# Patient Record
Sex: MALE | Race: BLACK OR AFRICAN AMERICAN | Employment: UNEMPLOYED | URBAN - METROPOLITAN AREA
[De-identification: names, ages, dates, MRNs, and addresses within clinical notes are randomized per-mention and may not be internally consistent; named-entity substitution may affect disease eponyms.]

---

## 2017-01-04 ENCOUNTER — OFFICE VISIT - HEALTHEAST (OUTPATIENT)
Dept: BEHAVIORAL HEALTH | Facility: CLINIC | Age: 27
End: 2017-01-04

## 2017-01-04 DIAGNOSIS — F33.1 MAJOR DEPRESSIVE DISORDER, RECURRENT EPISODE, MODERATE (H): ICD-10-CM

## 2017-01-04 DIAGNOSIS — F41.1 GAD (GENERALIZED ANXIETY DISORDER): ICD-10-CM

## 2017-01-04 DIAGNOSIS — F43.10 PTSD (POST-TRAUMATIC STRESS DISORDER): ICD-10-CM

## 2017-02-07 ENCOUNTER — AMBULATORY - HEALTHEAST (OUTPATIENT)
Dept: BEHAVIORAL HEALTH | Facility: CLINIC | Age: 27
End: 2017-02-07

## 2017-02-22 ENCOUNTER — OFFICE VISIT - HEALTHEAST (OUTPATIENT)
Dept: BEHAVIORAL HEALTH | Facility: CLINIC | Age: 27
End: 2017-02-22

## 2017-02-22 DIAGNOSIS — F43.10 PTSD (POST-TRAUMATIC STRESS DISORDER): ICD-10-CM

## 2017-02-22 DIAGNOSIS — F41.1 GAD (GENERALIZED ANXIETY DISORDER): ICD-10-CM

## 2017-02-22 DIAGNOSIS — F33.1 MAJOR DEPRESSIVE DISORDER, RECURRENT EPISODE, MODERATE (H): ICD-10-CM

## 2017-03-15 ENCOUNTER — OFFICE VISIT - HEALTHEAST (OUTPATIENT)
Dept: BEHAVIORAL HEALTH | Facility: CLINIC | Age: 27
End: 2017-03-15

## 2017-03-15 DIAGNOSIS — F43.10 PTSD (POST-TRAUMATIC STRESS DISORDER): ICD-10-CM

## 2017-03-15 DIAGNOSIS — F41.1 GAD (GENERALIZED ANXIETY DISORDER): ICD-10-CM

## 2017-03-15 DIAGNOSIS — F33.1 MAJOR DEPRESSIVE DISORDER, RECURRENT EPISODE, MODERATE (H): ICD-10-CM

## 2017-03-22 ENCOUNTER — OFFICE VISIT - HEALTHEAST (OUTPATIENT)
Dept: BEHAVIORAL HEALTH | Facility: CLINIC | Age: 27
End: 2017-03-22

## 2017-03-22 DIAGNOSIS — F43.10 PTSD (POST-TRAUMATIC STRESS DISORDER): ICD-10-CM

## 2017-03-22 DIAGNOSIS — F33.1 MAJOR DEPRESSIVE DISORDER, RECURRENT EPISODE, MODERATE (H): ICD-10-CM

## 2017-03-22 DIAGNOSIS — F41.1 GAD (GENERALIZED ANXIETY DISORDER): ICD-10-CM

## 2021-06-08 NOTE — PROGRESS NOTES
This therapist attempted to call patient to inform him of the PTSD Skills Group starting on 2/15 @ 100.

## 2022-02-01 ENCOUNTER — HOSPITAL ENCOUNTER (EMERGENCY)
Facility: CLINIC | Age: 32
Discharge: HOME | End: 2022-02-02
Attending: EMERGENCY MEDICINE | Admitting: EMERGENCY MEDICINE
Payer: COMMERCIAL

## 2022-02-01 DIAGNOSIS — F19.10 POLYSUBSTANCE ABUSE (H): ICD-10-CM

## 2022-02-01 DIAGNOSIS — F22 PSYCHOSIS, PARANOID (H): ICD-10-CM

## 2022-02-01 DIAGNOSIS — F60.2 ANTISOCIAL PERSONALITY DISORDER (H): ICD-10-CM

## 2022-02-01 LAB
AMPHETAMINES UR QL SCN: ABNORMAL
BARBITURATES UR QL: ABNORMAL
BENZODIAZ UR QL: ABNORMAL
CANNABINOIDS UR QL SCN: ABNORMAL
COCAINE UR QL: ABNORMAL
OPIATES UR QL SCN: ABNORMAL

## 2022-02-01 PROCEDURE — 99285 EMERGENCY DEPT VISIT HI MDM: CPT | Mod: 25 | Performed by: EMERGENCY MEDICINE

## 2022-02-01 PROCEDURE — C9803 HOPD COVID-19 SPEC COLLECT: HCPCS | Performed by: EMERGENCY MEDICINE

## 2022-02-01 PROCEDURE — 90791 PSYCH DIAGNOSTIC EVALUATION: CPT

## 2022-02-01 PROCEDURE — 80307 DRUG TEST PRSMV CHEM ANLYZR: CPT | Performed by: EMERGENCY MEDICINE

## 2022-02-01 PROCEDURE — U0003 INFECTIOUS AGENT DETECTION BY NUCLEIC ACID (DNA OR RNA); SEVERE ACUTE RESPIRATORY SYNDROME CORONAVIRUS 2 (SARS-COV-2) (CORONAVIRUS DISEASE [COVID-19]), AMPLIFIED PROBE TECHNIQUE, MAKING USE OF HIGH THROUGHPUT TECHNOLOGIES AS DESCRIBED BY CMS-2020-01-R: HCPCS | Performed by: EMERGENCY MEDICINE

## 2022-02-01 PROCEDURE — 99285 EMERGENCY DEPT VISIT HI MDM: CPT | Performed by: EMERGENCY MEDICINE

## 2022-02-02 VITALS
OXYGEN SATURATION: 100 % | TEMPERATURE: 98 F | RESPIRATION RATE: 14 BRPM | DIASTOLIC BLOOD PRESSURE: 71 MMHG | HEART RATE: 85 BPM | SYSTOLIC BLOOD PRESSURE: 110 MMHG

## 2022-02-02 LAB — SARS-COV-2 RNA RESP QL NAA+PROBE: NEGATIVE

## 2022-02-02 RX ORDER — ALBUTEROL SULFATE 90 UG/1
1-2 AEROSOL, METERED RESPIRATORY (INHALATION) EVERY 4 HOURS PRN
COMMUNITY
Start: 2021-12-22

## 2022-02-02 RX ORDER — LORATADINE 10 MG/1
10 TABLET ORAL DAILY
COMMUNITY
Start: 2021-11-23

## 2022-02-02 RX ORDER — OLANZAPINE 5 MG/1
5 TABLET ORAL AT BEDTIME
COMMUNITY
Start: 2021-09-13

## 2022-02-02 RX ORDER — BUPROPION HYDROCHLORIDE 300 MG/1
300 TABLET ORAL EVERY MORNING
COMMUNITY
Start: 2021-08-23

## 2022-02-02 RX ORDER — HYDROXYZINE PAMOATE 25 MG/1
CAPSULE ORAL
COMMUNITY
Start: 2021-09-01 | End: 2022-02-02

## 2022-02-02 RX ORDER — OLANZAPINE 10 MG/2ML
10 INJECTION, POWDER, FOR SOLUTION INTRAMUSCULAR
Status: DISCONTINUED | OUTPATIENT
Start: 2022-02-02 | End: 2022-02-02 | Stop reason: HOSPADM

## 2022-02-02 RX ORDER — ACETAMINOPHEN 160 MG
1 TABLET,DISINTEGRATING ORAL DAILY
COMMUNITY
Start: 2021-11-23

## 2022-02-02 RX ORDER — BUPROPION HYDROCHLORIDE 150 MG/1
150 TABLET ORAL EVERY MORNING
COMMUNITY
Start: 2021-08-11

## 2022-02-02 ASSESSMENT — ENCOUNTER SYMPTOMS
EYE REDNESS: 0
SORE THROAT: 0
ARTHRALGIAS: 0
FEVER: 0
CONFUSION: 0
DIFFICULTY URINATING: 0
HEADACHES: 0
SHORTNESS OF BREATH: 0
COLOR CHANGE: 0
ABDOMINAL PAIN: 0
NECK STIFFNESS: 0

## 2022-02-02 NOTE — ED PROVIDER NOTES
Patient stable here in the ER. Current plan at this point to admitted to [facility] for ongoing mental health evaluation by Dr. Zuniga. Forms filled out etc. patient be transferred once ride is here and reports with call.    /67   Pulse 91   Temp 98.2  F (36.8  C) (Oral)   Resp 16   SpO2 96%   This note was created at least in part by the use of dragon voice dictation system. Inadvertent typographical errors may still exist.  Remi Marie MD.    Patient evaluated in the emergency department during the COVID-19 pandemic period. Careful attention to patients safety was addressed throughout the evaluation. Evaluation and treatment management was initiated with disposition made efficiently and appropriate as possible to minimize any risk of potential exposure to patient during this evaluation.       Remi Marie MD  02/02/22 1224      Patient plan to be transferred to [facility] for ongoing management patient then when paramedics were here to transfer was somewhat resistant to going initially.      I talked to patient at length his is concerned that he needs a rule 25 initiated so he can help with his chemical dependency which he feels is the underlying cause of a lot of his issues.    I had contacted our intake people who contacted the unit who stated social workers can initiate the rule 25 of people around the unit.  Patient informed of this and was agreeable to going.      Patient then did not want to go via stretcher felt that the female paramedic was too masculine for him and he had other concerns he wanted to leave I instructed him that he needs to be reassessed again by her mental health .    Patient then seen by mental health  who had seen him earlier this morning also.  Still ongoing concerns with patient of some underlying psychosis with paranoia with some suicidal ideations that have been stated still somewhat heightened concern of safety issues without  clarification feel patient still poses a risk and a danger to himself and others potentially.      At this point we have no choice but to placement a hold insist that he come in for mental health stabilization and can also get rule 25 initiation also.      Planning to still  admit the patient to [facility] where he does have a bed is currently on a 72-hour hold for still continued safety concerns.               Remi Marie MD  02/02/22 1534

## 2022-02-02 NOTE — ED NOTES
Pt. has slept well throughout the night.  No behavioral issues noted this shift. Will remain in ED until appropriate bed available.  Continue present plan of care.

## 2022-02-02 NOTE — PHARMACY-ADMISSION MEDICATION HISTORY
Admission Medication History Completed by Pharmacy    See [facility] Admission Navigator for allergy information, preferred outpatient pharmacy, prior to admission medications and immunization status.     Medication history sources:  patient interview, SureScripts    Pertinent changes made to PTA medication list:  Added: N/A  Deleted: N/A  Changed: N/A    Additional medication history information:   - Patient states he hasn't had any medications in ~2 months, consistent with last dispense history.  - Patient denies taking any additional Rx/OTC medications other than the ones listed below.    Prior to Admission medications    Medication Sig Last Dose Taking? Auth Provider   albuterol (PROAIR HFA/PROVENTIL HFA/VENTOLIN HFA) 108 (90 Base) MCG/ACT inhaler Inhale 1-2 puffs into the lungs every 4 hours as needed for shortness of breath / dyspnea or wheezing  PRN Yes Reported, Patient   buPROPion (WELLBUTRIN XL) 150 MG 24 hr tablet Take 150 mg by mouth every morning Along with 300 mg tablet for total dose of 450 mg daily More than a month Yes Reported, Patient   buPROPion (WELLBUTRIN XL) 300 MG 24 hr tablet Take 300 mg by mouth every morning Along with 150 mg tablet for total dose of 450 mg daily More than a month Yes Reported, Patient   Cholecalciferol (VITAMIN D3) 50 MCG (2000 UT) CAPS Take 1 capsule by mouth daily More than a month Yes Reported, Patient   loratadine (CLARITIN) 10 MG tablet Take 10 mg by mouth daily  More than a month Yes Reported, Patient   OLANZapine (ZYPREXA) 5 MG tablet Take 5 mg by mouth At Bedtime  More than a month Yes Reported, Patient   omeprazole (PRILOSEC) 20 MG DR capsule Take 20 mg by mouth daily  More than a month Yes Reported, Patient     Date completed: 02/02/22    Medication history completed by:   Chong South, PharmD, BCPS  [facility]  Emergency Department: Ascom *[id number]
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
## 2022-02-02 NOTE — DISCHARGE INSTRUCTIONS
Discharge from the emergency room with plans to seek rule 25 and follow-up with [facility] for outpatient treatment as discussed.  Return to the emergency room if any thoughts of harming self or others.    If I am feeling unsafe or I am in a crisis, I will:  Contact my established care providers   Call the National Suicide Prevention Lifeline: [phone]   Go to the nearest emergency room   Call [phone]     Warning signs that I or other people might notice when a crisis is developing for me: I can susan easily frustrated, and avoid people    Things I am able to do on my own to cope or help me feel better: rest, take breaks    Things that I am able to do with others to cope or help me better: spend time with my kids and my family    Things I can use or do for distraction: TV, Movies    Changes I can make to support my mental health and wellness: Susan Rule 25, follow recommendations    People in my life that I can ask for help: [employer] Staff    Your [facility] has a mental health crisis team you can call 24/7: Carlos A SCOTT [phone]    Other things that are important when I m in crisis: Raising my kids     Additional resources and information:     Go to [facility] 2/3/2022 for an intake meeting with Devonte

## 2022-02-02 NOTE — ED NOTES
Patient given 72 hour hold paperwork. Patient became agitated and started threatening to have his people and  come down here and cause problems. Patient is confidential in the system and should not have visitors at this time. Patient continues to remain in room on cell phone.
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
## 2022-02-02 NOTE — ED NOTES
[facility] ED Mental Health Handoff Note:       Brief HPI:  This is a 32 year old male signed out to me by Dr. Balbuena.  See initial ED Provider note for full details of the presentation.   Interval history is pertinent for no acute issues  .    Home meds reviewed and ordered/administered: Yes    Medically stable for inpatient mental health admission: Yes.    Evaluated by mental health: Yes. The recommendation is for inpatient mental health treatment. Bed search in process    Safety concerns: At the time I received sign out, there were no safety concerns.    Hold Status:  Active Orders   Legal    Health Officer Authority to Detain (MARISELA)     Frequency: Effective Now     Start Date/Time: 02/01/22 1901      Number of Occurrences: Until Specified     Order Comments: This patient presented with circumstances that have led me to be reasonably suspicious that the patient is at significant risk of self-harm. The patient's judgment to this situation appears to be impaired. Given the circumstances in which the patient presented, it is likely that the patient is at significant risk of attempting self harm if this situation is not investigated further. I am highly concerned that the patient is mentally ill and currently cannot safely care for oneself. This represents endangerment to the patient's well-being and safety, and I am placing a Health Officer Authority hold upon the patient at this time.              Exam:   Patient Vitals for the past 24 hrs:   BP Temp Temp src Pulse Resp SpO2   02/02/22 0829 101/67 -- -- 91 -- 96 %   02/01/22 2344 120/76 98.2  F (36.8  C) Oral 76 16 100 %   02/01/22 1858 113/72 97.6  F (36.4  C) Oral 84 18 100 %             ED Course:    Medications - No data to display         There were significant events during my shift. See other notes.    Patient was signed out to the oncoming provider, Dr. Pelaez      Impression:    ICD-10-CM    1. Polysubstance abuse (H)  F19.10    2. Psychosis,  paranoid (H)  F22     suspect 2/2 methamphetatmines       Plan:    1. Awaiting inpatient mental health admission/transfer.      RESULTS:   Results for orders placed or performed during the hospital encounter of 02/01/22 (from the past 24 hour(s))   Asymptomatic COVID-19 Virus (Coronavirus) by PCR Nasopharyngeal     Status: Normal    Collection Time: 02/01/22 10:28 PM    Specimen: Nasopharyngeal; Swab   Result Value Ref Range    SARS CoV2 PCR Negative Negative    Narrative    Testing was performed using the franck  SARS-CoV-2 & Influenza A/B Assay on the franck  Licha  System.  This test should be ordered for the detection of SARS-COV-2 in individuals who meet SARS-CoV-2 clinical and/or epidemiological criteria. Test performance is unknown in asymptomatic patients.  This test is for in vitro diagnostic use under the FDA EUA for laboratories certified under CLIA to perform moderate and/or high complexity testing. This test has not been FDA cleared or approved.  A negative test does not rule out the presence of PCR inhibitors in the specimen or target RNA in concentration below the limit of detection for the assay. The possibility of a false negative should be considered if the patient's recent exposure or clinical presentation suggests COVID-19.  [facility] are certified under the Clinical Laboratory Improvement Amendments of 1988 (CLIA-88) as qualified to perform moderate and/or high complexity laboratory testing.   Urine Drugs of Abuse Screen     Status: Abnormal    Collection Time: 02/01/22 10:30 PM    Narrative    The following orders were created for panel order Urine Drugs of Abuse Screen.  Procedure                               Abnormality         Status                     ---------                               -----------         ------                     Drug abuse screen 1 urin...[[id number]]  Abnormal            Final result                 Please view results for these tests on the  individual orders.   Drug abuse screen 1 urine (ED)     Status: Abnormal    Collection Time: 02/01/22 10:30 PM   Result Value Ref Range    Amphetamines Urine Screen Positive (A) Screen Negative    Barbiturates Urine Screen Negative Screen Negative    Benzodiazepines Urine Screen Negative Screen Negative    Cannabinoids Urine Screen Positive (A) Screen Negative    Cocaine Urine Screen Negative Screen Negative    Opiates Urine Screen Negative Screen Negative             MD Cynthia Leonardo, Remi Morgan MD  02/02/22 1954

## 2022-02-02 NOTE — PROGRESS NOTES
Sanaz Parson is reviewed for [facility] service. Will follow and meet with patient/family/care team as able or requested.     Ricky Etienne  [facility], [facility]   [phone]

## 2022-02-02 NOTE — ED NOTES
[facility] ED Mental Health Handoff Note:       Brief HPI:  This is a 32 year old male signed out to me by Dr. Balbuena.  See initial ED Provider note for full details of the presentation. Interval history is pertinent for no acute issues.    Home meds reviewed and ordered/administered: Yes    Medically stable for inpatient mental health admission: Yes.    Evaluated by mental health: Yes. The recommendation is for inpatient mental health treatment. Bed search in process    Safety concerns: At the time I received sign out, there were no safety concerns.    Hold Status:  Active Orders   Legal    Health Officer Authority to Detain (MARISELA)     Frequency: Effective Now     Start Date/Time: 02/01/22 1901      Number of Occurrences: Until Specified     Order Comments: This patient presented with circumstances that have led me to be reasonably suspicious that the patient is at significant risk of self-harm. The patient's judgment to this situation appears to be impaired. Given the circumstances in which the patient presented, it is likely that the patient is at significant risk of attempting self harm if this situation is not investigated further. I am highly concerned that the patient is mentally ill and currently cannot safely care for oneself. This represents endangerment to the patient's well-being and safety, and I am placing a Health Officer Authority hold upon the patient at this time.              Exam:   Patient Vitals for the past 24 hrs:   BP Temp Temp src Pulse Resp SpO2   02/02/22 0829 101/67 -- -- 91 -- 96 %   02/01/22 2344 120/76 98.2  F (36.8  C) Oral 76 16 100 %   02/01/22 1858 113/72 97.6  F (36.4  C) Oral 84 18 100 %             ED Course:    Medications - No data to display         There were significant events during my shift. See other notes for documentation.    Patient was signed out to the oncoming provider, Dr. Pelaez      Impression:    ICD-10-CM    1. Polysubstance abuse (H)  F19.10     2. Psychosis, paranoid (H)  F22     suspect 2/2 methamphetatmines       Plan:    1. Awaiting inpatient mental health admission/transfer.      RESULTS:   Results for orders placed or performed during the hospital encounter of 02/01/22 (from the past 24 hour(s))   Asymptomatic COVID-19 Virus (Coronavirus) by PCR Nasopharyngeal     Status: Normal    Collection Time: 02/01/22 10:28 PM    Specimen: Nasopharyngeal; Swab   Result Value Ref Range    SARS CoV2 PCR Negative Negative    Narrative    Testing was performed using the franck  SARS-CoV-2 & Influenza A/B Assay on the franck  Licha  System.  This test should be ordered for the detection of SARS-COV-2 in individuals who meet SARS-CoV-2 clinical and/or epidemiological criteria. Test performance is unknown in asymptomatic patients.  This test is for in vitro diagnostic use under the FDA EUA for laboratories certified under CLIA to perform moderate and/or high complexity testing. This test has not been FDA cleared or approved.  A negative test does not rule out the presence of PCR inhibitors in the specimen or target RNA in concentration below the limit of detection for the assay. The possibility of a false negative should be considered if the patient's recent exposure or clinical presentation suggests COVID-19.  [facility] are certified under the Clinical Laboratory Improvement Amendments of 1988 (CLIA-88) as qualified to perform moderate and/or high complexity laboratory testing.   Urine Drugs of Abuse Screen     Status: Abnormal    Collection Time: 02/01/22 10:30 PM    Narrative    The following orders were created for panel order Urine Drugs of Abuse Screen.  Procedure                               Abnormality         Status                     ---------                               -----------         ------                     Drug abuse screen 1 urin...[[id number]]  Abnormal            Final result                 Please view results for these  tests on the individual orders.   Drug abuse screen 1 urine (ED)     Status: Abnormal    Collection Time: 02/01/22 10:30 PM   Result Value Ref Range    Amphetamines Urine Screen Positive (A) Screen Negative    Barbiturates Urine Screen Negative Screen Negative    Benzodiazepines Urine Screen Negative Screen Negative    Cannabinoids Urine Screen Positive (A) Screen Negative    Cocaine Urine Screen Negative Screen Negative    Opiates Urine Screen Negative Screen Negative             MD Cynthia Leonardo, Remi Morgan MD  02/02/22 1950
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
## 2022-02-02 NOTE — PROGRESS NOTES
"Extended Care    Sanaz Parson  February 2, 2022    Sanaz is followed related to Other: reassessment. Please see initial DEC Crisis Assessment completed by Natalie Fritsch on 2/1/2022 for complete assessment information. Notable concerns include suicidal ideation, substance abuse, auditory, visual and tactile hallucinations.     Patient is interviewed for a 2nd time today via [employer]. Pt is alert and listless; affect is hostile; mood is frustrated and anxious. Patient appears much more paranoid as writer met with him a second time. Patient reports that \"funny stuff is happening here they are trying to mess with me now man\". Patient does not believe staff are being honest with him, and makes loose associations stating, \"That woman is to masculine, but she pees sitting down\" and and \"they cant  me because they sleep in queen mattresses and I have a hospital bed\".. Patient then states, \"I need a rule 25, and inpatient, and apparently they cannot give me that here\" although patient had refused a transport for an inpatient  bed. When writer spoke with pt earlier pt endorsed suicidal thoughts, but does not disclose what they are, or if he has a plan, but does reference using drugs to kill himself. However when asked about suicidal thoughts again patient reports, \"Whatever happens happens, I just need to get out of here\". There are concerns for Physical aggression, as yesterday patient made statements about attacking others so they would attack and kill him. Over the course of contact, provided reassurance, offered validation, engaged patient in problem solving and disposition planning and provided psychoeducation.     Writer feels that due to patients current paranoia, as well as thoughts of using drugs to overdose that patient is a risk to himself and making statements about attacking others so they might attack and kill him,      ED care team is updated, including Dr. Marie. Discussed patient is likely " "unsafe to be discharged.     Writer received a call from Dr. Pelaez requesting an additional reassessment be completed.    Writer met with patient again who immediately apologized to writer for her previous behavior and states, \"I should have taken a breathe and explained this to you, I just needed to rest my body and mind a bit\" Patient also reports \" I need help so I set up services with [facility] I have an intake with Devonte at [facility] [address] I gotta get off drugs to get my mind right\" Patient and writer called [facility] and it was confirmed by staff that Sanaz had an intake appointment scheduled on 2/3/2022. Patient is presenting much more calm and logical than in the second meeting and he described feeling overwhelmed and worried about transferring to a different facility. Patient denies having thoughts of suicide and reports, \"when I said whatever happens happens I just meant I cant control everything, but I wont do anything to hurt my self I have my kids\". Patient reports having an 11 year old daughter and a 6 year old son. He also identifies that he will be going to his aunts [place] today who will help him get to [facility] tomorrow. Patient was additionally able to develop a safety plan with writer.    Writer updated Dr. Pelaez that patient seems to be in a much better mindset than earlier in the day, and appears to have more forward focus and expressed desire to attend CD treatment.    Plan:   If I am feeling unsafe or I am in a crisis, I will:  Contact my established care providers   Call the National Suicide Prevention Lifeline: [phone]   Go to the nearest emergency room   Call [phone]     Warning signs that I or other people might notice when a crisis is developing for me: I can susan easily frustrated, and avoid people    Things I am able to do on my own to cope or help me feel better: rest, take breaks    Things that I am able to do with others to cope or help me better: spend time with my kids and my " family    Things I can use or do for distraction: TV, Movies    Changes I can make to support my mental health and wellness: Michael Rule 25, follow recommendations    People in my life that I can ask for help: Aunt, Buzz Staff    Your [facility] has a mental health crisis team you can call 24/7: Carlos A SCOTT [phone]    Other things that are important when I m in crisis: Raising my kids     Additional resources and information:     Go to [facility] 2/3/2022 for an intake meeting with Devonte [facility] will follow. DEC may be reached at [phone] if further clinical intervention is needed.     Ricky Etienne  [facility], [facility]   [phone]

## 2022-02-02 NOTE — ED NOTES
I was asked to reevaluate the patient he was requesting discharge and at this time after further discussion with both Dr. Marie and the  he was further reassessed and determined that at this time did not appear to be an immediate threat to himself he had made statements that were somewhat threatening to nursing staff we discussed this and at this time patient denies any intent and denies any intent to harm himself he was reevaluated by the  who agrees that at this point patient appears to likely have had malingering behaviors with antisocial personality disorder as well as ongoing drug addiction.  Patient is very forward thinking has contacted [facility] understands that he will be getting a rule 25 from [facility] and is once again requesting discharge. He will be discharged from the emergency room.     Rakesh Pelaez MD  02/02/22 2390

## 2022-02-02 NOTE — PROGRESS NOTES
Extended Care    Sanaz Parson  February 2, 2022    Sanaz is followed related to Placement delay: 18+ Hours at the time of this note. Please see initial DEC Crisis Assessment completed by Natalie Fritsch on 2/1/2022 for complete assessment information. Notable concerns include suicidal ideation, substance abuse, auditory, visual and tactile hallucinations    Patient is interviewed via [employer]. Pt is alert and interactive; affect is appropriate; mood is anxious. Pt endorses suicidal thoughts, but does not disclose what they are, or if he has a plan, but does reference using drugs to kill himself. Patient Over the course of contact, provided reassurance, offered validation and provided psychoeducation. Patient was not wanting to process through anything; however did request a rule 25. Patient did appear somewhat paranoid at times and continues to think that people are placing drugs in his drugs. He reports meth and cannabis as his main drugs of choice.     Writer continues to support inpatient admission which patient is in agreement with.    Plan:     Continue to monitor for harm. Consider: Use a positive, direct and calm approach. Pt's tend to match the energy/mood of the staff. Keep focus positive and upbeat, Listen in a neutral, non-judgmental way. Offer reassurance and Be mindful of your nonverbal cues (body language, facial expressions)    Continue care coordination with central intake     Maintain current transition plan. Next steps include: await inpatient placement..    DEC will follow. DEC may be reached at [phone] if further clinical intervention is needed.     Ricky Etienne  [facility], [facility]   [phone]

## 2022-02-02 NOTE — ED NOTES
Pt brought back into the ED and pt is asking how soon the intake will get done because he needs some food and wants to go to sleep. Pt told of the process and Pt asked if we could speed things up.  Pt told we would do the best that we can.